# Patient Record
Sex: FEMALE | URBAN - METROPOLITAN AREA
[De-identification: names, ages, dates, MRNs, and addresses within clinical notes are randomized per-mention and may not be internally consistent; named-entity substitution may affect disease eponyms.]

---

## 2021-05-26 ENCOUNTER — APPOINTMENT (OUTPATIENT)
Dept: URBAN - METROPOLITAN AREA CLINIC 264 | Age: 31
Setting detail: DERMATOLOGY
End: 2021-05-26

## 2021-05-26 DIAGNOSIS — Z41.9 ENCOUNTER FOR PROCEDURE FOR PURPOSES OTHER THAN REMEDYING HEALTH STATE, UNSPECIFIED: ICD-10-CM

## 2021-05-26 PROCEDURE — OTHER PRODUCT LINE (ZO): OTHER

## 2021-05-26 NOTE — PROCEDURE: PRODUCT LINE (ZO)
Product 13 Units: 0
Product 34 Price (In Dollars - Numeric Only, No Special Characters Or $): 0.00
Detail Level: Zone
Product 1 Price (In Dollars - Numeric Only, No Special Characters Or $): 180
Render Product Pricing In Note: Yes
Product 2 Price (In Dollars - Numeric Only, No Special Characters Or $): 130
Product 1 Application Directions: Hand written instructions given to patient.
Name Of Product 1: Skin Normalizing Kit (20% off this month)
Product 1 Units: 1
Name Of Product 2: Growth factor eye serum
Risk Of Complication Category: Minimal
Assigning Risk Information: Per AMA, level of risk is based upon consequences of the problem(s) addressed at the encounter when appropriately treated. Risk also includes medical decision making related to the need to initiate or forego further testing, treatment and/or hospitalization. Over the counter medication are assigned a risk level of low. Prescription medication management is assigned a risk level of moderate.